# Patient Record
Sex: MALE | Race: WHITE | NOT HISPANIC OR LATINO | URBAN - METROPOLITAN AREA
[De-identification: names, ages, dates, MRNs, and addresses within clinical notes are randomized per-mention and may not be internally consistent; named-entity substitution may affect disease eponyms.]

---
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
## 2019-01-25 ENCOUNTER — OFFICE VISIT - RIVER FALLS (OUTPATIENT)
Dept: FAMILY MEDICINE | Facility: CLINIC | Age: 19
End: 2019-01-25

## 2019-01-25 ENCOUNTER — COMMUNICATION - RIVER FALLS (OUTPATIENT)
Dept: FAMILY MEDICINE | Facility: CLINIC | Age: 19
End: 2019-01-25

## 2019-01-25 LAB
ALBUMIN UR-MCNC: NEGATIVE G/DL
BACTERIA #/AREA URNS HPF: NORMAL /[HPF]
BILIRUB UR QL STRIP: NEGATIVE
EPITHELIAL CELLS UR: NORMAL
GLUCOSE UR STRIP-MCNC: NEGATIVE MG/DL
HGB UR QL STRIP: ABNORMAL
KETONES UR STRIP-MCNC: NEGATIVE MG/DL
LEUKOCYTE ESTERASE UR QL STRIP: NEGATIVE
NITRATE UR QL: NEGATIVE
PH UR STRIP: 7 [PH] (ref 5–8)
RBC #/AREA URNS AUTO: NORMAL /[HPF]
SP GR UR STRIP: 1.02 (ref 1–1.03)
WBC #/AREA URNS AUTO: NORMAL /[HPF]

## 2019-01-25 ASSESSMENT — MIFFLIN-ST. JEOR: SCORE: 1793.25

## 2019-01-26 LAB
CHLAMYDIA TRACHOMATIS RNA, TMA - QUEST: NOT DETECTED
HIV AG/AB  - NOTE: NORMAL
NEISSERIA GONORRHOEAE RNA TMA: DETECTED
RPR: NORMAL

## 2019-01-27 LAB — BACTERIA SPEC CULT: NORMAL

## 2019-03-13 ENCOUNTER — OFFICE VISIT - RIVER FALLS (OUTPATIENT)
Dept: FAMILY MEDICINE | Facility: CLINIC | Age: 19
End: 2019-03-13

## 2019-03-13 ASSESSMENT — MIFFLIN-ST. JEOR: SCORE: 1798.69

## 2019-04-17 ENCOUNTER — OFFICE VISIT - RIVER FALLS (OUTPATIENT)
Dept: FAMILY MEDICINE | Facility: CLINIC | Age: 19
End: 2019-04-17

## 2019-04-17 ASSESSMENT — MIFFLIN-ST. JEOR: SCORE: 1761.5

## 2019-04-18 LAB
CHLAMYDIA TRACHOMATIS RNA, TMA - QUEST: NOT DETECTED
NEISSERIA GONORRHOEAE RNA TMA: NOT DETECTED

## 2019-07-05 ENCOUNTER — OFFICE VISIT - RIVER FALLS (OUTPATIENT)
Dept: FAMILY MEDICINE | Facility: CLINIC | Age: 19
End: 2019-07-05

## 2019-07-05 ASSESSMENT — MIFFLIN-ST. JEOR: SCORE: 1788.71

## 2022-02-11 VITALS
HEART RATE: 64 BPM | BODY MASS INDEX: 28.72 KG/M2 | SYSTOLIC BLOOD PRESSURE: 118 MMHG | DIASTOLIC BLOOD PRESSURE: 74 MMHG | RESPIRATION RATE: 16 BRPM | WEIGHT: 183 LBS | TEMPERATURE: 97.6 F | HEIGHT: 67 IN

## 2022-02-11 VITALS
DIASTOLIC BLOOD PRESSURE: 76 MMHG | SYSTOLIC BLOOD PRESSURE: 118 MMHG | BODY MASS INDEX: 27.6 KG/M2 | TEMPERATURE: 97.7 F | HEART RATE: 60 BPM | WEIGHT: 176.2 LBS

## 2022-02-11 VITALS
DIASTOLIC BLOOD PRESSURE: 62 MMHG | TEMPERATURE: 97.1 F | DIASTOLIC BLOOD PRESSURE: 80 MMHG | SYSTOLIC BLOOD PRESSURE: 120 MMHG | TEMPERATURE: 97.8 F | HEART RATE: 64 BPM | HEIGHT: 67 IN | WEIGHT: 185.2 LBS | RESPIRATION RATE: 16 BRPM | BODY MASS INDEX: 27.78 KG/M2 | BODY MASS INDEX: 29.07 KG/M2 | HEART RATE: 64 BPM | WEIGHT: 177 LBS | SYSTOLIC BLOOD PRESSURE: 116 MMHG | HEIGHT: 67 IN

## 2022-02-11 VITALS
RESPIRATION RATE: 16 BRPM | DIASTOLIC BLOOD PRESSURE: 82 MMHG | TEMPERATURE: 98.2 F | WEIGHT: 184 LBS | BODY MASS INDEX: 28.88 KG/M2 | HEART RATE: 72 BPM | HEIGHT: 67 IN | SYSTOLIC BLOOD PRESSURE: 130 MMHG

## 2022-02-15 NOTE — NURSING NOTE
Comprehensive Intake Entered On:  1/25/2019 3:42 PM CST    Performed On:  1/25/2019 3:34 PM CST by Eleuterio Latif CMA               Summary   Chief Complaint :   Pt here for painful urination and penile discharge x day.   Menstrual Status :   N/A   Weight Measured :   184 lb(Converted to: 184 lb 0 oz, 83.46 kg)    Height Measured :   67 in(Converted to: 5 ft 7 in, 170.18 cm)    Body Mass Index :   28.82 kg/m2   Body Surface Area :   1.98 m2   Systolic Blood Pressure :   130 mmHg   Diastolic Blood Pressure :   82 mmHg (HI)    Mean Arterial Pressure :   98 mmHg   Peripheral Pulse Rate :   72 bpm   BP Site :   Right arm   Pulse Site :   Radial artery   Temperature Tympanic :   98.2 DegF(Converted to: 36.8 DegC)    Respiratory Rate :   16 br/min   Eleuterio Latif CMA - 1/25/2019 3:34 PM CST   Health Status   Immunizations Current :   No   Eleuterio Latif CMA - 1/25/2019 3:34 PM CST   Meds / Allergies   (As Of: 1/25/2019 3:42:31 PM CST)   Allergies (Active)   No known allergies  Estimated Onset Date:   Unspecified ; Created By:   Jessica Thomas; Reaction Status:   Active ; Category:   Drug ; Substance:   No known allergies ; Type:   Allergy ; Updated By:   Jessica Thomas; Reviewed Date:   5/18/2017 2:17 PM CDT        Medication List   (As Of: 1/25/2019 3:42:31 PM CST)   Prescription/Discharge Order    betamethasone topical  :   betamethasone topical ; Status:   Prescribed ; Ordered As Mnemonic:   betamethasone dipropionate 0.05% topical cream ; Simple Display Line:   1 sabi, TOP, BID, 50 gm, 2 Refill(s) ; Ordering Provider:   William Campos MD; Catalog Code:   betamethasone topical ; Order Dt/Tm:   5/22/2017 5:44:40 PM          triamcinolone topical  :   triamcinolone topical ; Status:   Prescribed ; Ordered As Mnemonic:   triamcinolone 0.1% topical cream ; Simple Display Line:   1 sabi, TOP, BID, 15 g, 1 Refill(s) ; Ordering Provider:   William Campos MD; Catalog Code:   triamcinolone topical ; Order Dt/Tm:    2017 2:22:11 PM            Procedures / Surgeries        -    Procedure History   (As Of: 2019 3:42:31 PM CST)     Family History   Family History   (As Of: 2019 3:42:31 PM CST)   Grandmother (P): unknown  Full Name:   unknown ; Relation:   Grandmother (P) ;             Nomenclature:   Kidney cancer ; Value:   Positive            Social History   Social History   (As Of: 2019 3:42:31 PM CST)   Tobacco:        Electronic Cigarettes   (Last Updated: 2019 3:39:03 PM CST by Eleuterio Latif CMA)          Employment and Education:        Student   Comments:  2019 3:39 PM - Eleuterio Latif CMA: Freshman at [employer]   (Last Updated: 2019 3:39:43 PM CST by Eleuterio Latif CMA)

## 2022-02-15 NOTE — PROGRESS NOTES
Patient:   MARISSA SINCLAIR            MRN: [id number]            FIN: [id number]               Age:   17 years     Sex:  Male     :  2000   Associated Diagnoses:   Atopic eczema   Author:   William Campos MD      Visit Information      Date of Service: 2017 01:39 pm  Performing Location: [facility]  Encounter#: [id number]      Chief Complaint   2017 1:50 PM CDT    Pt c/o excema sx.        History of Present Illness   Here with eczema on the arms and legs  treated in the past with topical steroids  mild involvement of neck and face      Review of Systems   All other systems were reviewed and are negative.      Health Status   Allergies:    Allergic Reactions (Selected)  No known allergies      Histories   Past Medical History:    No active or resolved past medical history items have been selected or recorded.   Family History:    No family history items have been selected or recorded.   Procedure history:    No active procedure history items have been selected or recorded.      Physical Examination   Vital Signs   2017 1:50 PM CDT Temperature Tympanic 97.7 DegF  LOW    Peripheral Pulse Rate 60 bpm    Systolic Blood Pressure 118 mmHg    Diastolic Blood Pressure 76 mmHg    Mean Arterial Pressure 90 mmHg      Measurements from flowsheet : Measurements   2017 1:50 PM CDT    Weight Measured - Standard                176.2 lb     General:  Alert and oriented, No acute distress.    Integumentary:  eczematous rash in antecubital and popliteal fossae, posterior neck and upper lip.       Impression and Plan   Diagnosis     Atopic eczema ([id number]-[place] L20.9).     Course:  Improving.    Plan:  topical steroids and follow up as needed.    Orders     Orders   Pharmacy:  triamcinolone 0.1% topical cream (Prescribe): 1 sabi, TOP, BID, # 15 g, 1 Refill(s), Type: Maintenance, Pharmacy: [id number], 1 sabi top bid  clobetasol 0.05% topical cream (Prescribe): 1 sabi, TOP, BID, # 45 g, 1  Refill(s), Type: Maintenance, Pharmacy: [id number], 1 sabi top bid.

## 2022-02-15 NOTE — LETTER
(Inserted Image. Unable to display)     March 29, 2019      MARISSA SINCLAIR   [address] [place], [place] [id number]          Dear MARISSA,      Thank you for selecting [facility] ([facility]) for your healthcare needs.      Our records indicate you are due for the following services:       Urine Labs ~ Please be prepared to leave a urine specimen for evaluation.      To schedule an appointment or if you have further questions, please contact your primary clinic:   [facility]       [phone]   [facility]       [phone]              [facility]     [phone]      Powered by [employer] and [employer]    Sincerely,    Rusty Tejada PA-C

## 2022-02-15 NOTE — TELEPHONE ENCOUNTER
---------------------  From: Silvia Medel CMA (Phone Messages Pool ([id number]_[facility]))   To: [facility] Message Pool ([id number]_[facility]);     Sent: 2/6/2019 11:10:06 AM CST  Subject: Phone Note: return call     Phone Message    PCP:   OLMAN, asked for [facility]      Time of Call:  11:07 am       Person Calling:  pt  Phone number:  [phone]    Returned call at: n/a    Note:   Pt calls requesting a call back from [facility] around 4pm today.    Last office visit and reason:  1/25/19; dysuria, urethral dischargeI called pt back and he just wanted to know when he needed to RTC for FU testing for positive gonorrhea from 1/25/19 visit with [facility].  I informed him per [facility] 1/28/19 that he needs to FU in 8 weeks, and that a RTC letter will be sent to him for a 3/29/19 lab visit for repeat UA.  Pt voiced understanding of these instructions.  Eleuterio Latif CMA

## 2022-02-15 NOTE — TELEPHONE ENCOUNTER
---------------------  From: Alana Srinivasan   To: William Campos MD; Terrell BUTTERFIELD, Rusty OSEGUERA;     Cc: Urban TAVERAS, Karol; Phone Messages Pool ([id number]_[place] [facility] [place]);      Sent: 1/27/2019 12:28:13 PM CST  Subject: Results Follow Up: Gonorrhe     Please review results.  Positive for Gonorrhoeae.  Patient seen on 1/25/19 for discharge with DWG.  Treated with Azithromycin 1000mg once.         Results:  Date Result Name Ind Value Ref Range   1/25/2019 3:57 PM HIV Ag/Ab  NON-REACTIVE (NONREACTIVE - NONREACTIVE)   1/25/2019 3:57 PM RPR Ql  NON-REACTIVE (NONREACTIVE - NONREACTIVE)   1/25/2019 4:20 PM Chlam/N. gonorrhea Comments  See comment    1/25/2019 4:20 PM Chlamydia RNA  NOT DETECTED (NOT DETECTED - )   1/25/2019 4:20 PM Neisseria gonorrhoeae RNA ((A)) DETECTED (NOT DETECTED - )   1/25/2019 4:21 PM Culture Urine  See comment---------------------  From: William Campos MD   To: Alana Srinivasan;     Sent: 1/27/2019 1:10:12 PM CST  Subject: RE: Results Follow Up: Gonorrhe     He was treated with ceftriaxone which is adequate coverage for GC

## 2022-02-15 NOTE — NURSING NOTE
Comprehensive Intake Entered On:  3/13/2019 12:55 PM CDT    Performed On:  3/13/2019 12:52 PM CDT by Elham Rodriguez CMA               Summary   Chief Complaint :   c/o redness rash on forehead and upper lip   Menstrual Status :   N/A   Weight Measured :   185.2 lb(Converted to: 185 lb 3 oz, 84.01 kg)    Height Measured :   67 in(Converted to: 5 ft 7 in, 170.18 cm)    Body Mass Index :   29 kg/m2 (HI)    Body Surface Area :   1.99 m2   Systolic Blood Pressure :   116 mmHg   Diastolic Blood Pressure :   62 mmHg   Mean Arterial Pressure :   80 mmHg   Peripheral Pulse Rate :   64 bpm   Temperature Tympanic :   97.8 DegF(Converted to: 36.6 DegC)  (LOW)    Elham Rodriguez CMA - 3/13/2019 12:52 PM CDT   Health Status   Allergies Verified? :   Yes   Medication History Verified? :   Yes   Immunizations Current :   No   Pre-Visit Planning Status :   Completed   Tobacco Use? :   Never smoker   Elham Rodriguez CMA - 3/13/2019 12:52 PM CDT   Consents   Consent for Immunization Exchange :   Consent Granted   Consent for Immunizations to Providers :   Consent Granted   Elham Rodriguez CMA - 3/13/2019 12:52 PM CDT   Meds / Allergies   (As Of: 3/13/2019 12:55:53 PM CDT)   Allergies (Active)   No known allergies  Estimated Onset Date:   Unspecified ; Created By:   Jessica Thomas; Reaction Status:   Active ; Category:   Drug ; Substance:   No known allergies ; Type:   Allergy ; Updated By:   Jessica Thomas; Reviewed Date:   1/25/2019 3:44 PM CST        Medication List   (As Of: 3/13/2019 12:55:53 PM CDT)   Prescription/Discharge Order    azithromycin  :   azithromycin ; Status:   Processing ; Ordered As Mnemonic:   azithromycin 500 mg oral tablet ; Ordering Provider:   Rusty Tejada PA-C; Action Display:   Complete ; Catalog Code:   azithromycin ; Order Dt/Tm:   3/13/2019 12:54:16 PM          betamethasone topical  :   betamethasone topical ; Status:   Prescribed ; Ordered As Mnemonic:   betamethasone  dipropionate 0.05% topical cream ; Simple Display Line:   1 sabi, TOP, BID, 50 gm, 2 Refill(s) ; Ordering Provider:   William Campos MD; Catalog Code:   betamethasone topical ; Order Dt/Tm:   5/22/2017 5:44:40 PM          triamcinolone topical  :   triamcinolone topical ; Status:   Prescribed ; Ordered As Mnemonic:   triamcinolone 0.1% topical cream ; Simple Display Line:   1 sabi, TOP, BID, 15 g, 1 Refill(s) ; Ordering Provider:   William Campos MD; Catalog Code:   triamcinolone topical ; Order Dt/Tm:   5/18/2017 2:22:11 PM

## 2022-02-15 NOTE — NURSING NOTE
Comprehensive Intake Entered On:  7/5/2019 4:49 PM CDT    Performed On:  7/5/2019 4:44 PM CDT by Eleuterio Latif CMA               Summary   Chief Complaint :   Pt here for dry cracked skin on top lip x 2 days.   Menstrual Status :   N/A   Weight Measured :   183 lb(Converted to: 183 lb 0 oz, 83.01 kg)    Height Measured :   67 in(Converted to: 5 ft 7 in, 170.18 cm)    Body Mass Index :   28.66 kg/m2 (HI)    Body Surface Area :   1.98 m2   Systolic Blood Pressure :   118 mmHg   Diastolic Blood Pressure :   74 mmHg   Mean Arterial Pressure :   89 mmHg   Peripheral Pulse Rate :   64 bpm   BP Site :   Right arm   Pulse Site :   Radial artery   Temperature Tympanic :   97.6 DegF(Converted to: 36.4 DegC)  (LOW)    Respiratory Rate :   16 br/min   Eleuterio Latif CMA - 7/5/2019 4:44 PM CDT   Health Status   Allergies Verified? :   Yes   Medication History Verified? :   Yes   Immunizations Current :   No   Medical History Verified? :   Yes   Pre-Visit Planning Status :   Not completed   Tobacco Use? :   Current every day smoker   Eleuterio Latfi CMA - 7/5/2019 4:44 PM CDT   Meds / Allergies   (As Of: 7/5/2019 4:49:20 PM CDT)   Allergies (Active)   No known allergies  Estimated Onset Date:   Unspecified ; Created By:   Jessica Thomas; Reaction Status:   Active ; Category:   Drug ; Substance:   No known allergies ; Type:   Allergy ; Updated By:   Jessica Thomas; Reviewed Date:   7/5/2019 4:49 PM CDT        Medication List   (As Of: 7/5/2019 4:49:20 PM CDT)   Prescription/Discharge Order    triamcinolone topical  :   triamcinolone topical ; Status:   Prescribed ; Ordered As Mnemonic:   triamcinolone 0.1% topical cream ; Simple Display Line:   1 sabi, Topical, tid, 60 gm, 3 Refill(s) ; Ordering Provider:   Adalid De La Crzu MD; Catalog Code:   triamcinolone topical ; Order Dt/Tm:   3/13/2019 1:02:21 PM            Social History   Social History   (As Of: 7/5/2019 4:49:20 PM CDT)   Tobacco:        Electronic Cigarettes   (Last  Updated: 1/25/2019 3:39:03 PM CST by Eleuterio Latif CMA)          Employment/School:        Student   Comments:  1/25/2019 3:39 PM - Eleuterio Latif CMA: Freshman at [employer]   (Last Updated: 1/25/2019 3:39:43 PM CST by Eleuterio Latif CMA)

## 2022-02-15 NOTE — LETTER
(Inserted Image. Unable to display)   January 28, 2019      MARISSA SINCLAIR       [address] [place], [place] [id number]        Dear MARISSA,    Thank you for selecting [facility] for your healthcare needs.  Below you will find the results of you recent tests done at our clinic.    Your test came back positive for gonorrhea.  You have received adequate treatment for that.  Please follow up in clinic if your symptoms are not improving.  The rest of your tests were negative.  Please come in to recheck for gonorrhea in 8 weeks.      Result Name Current Result Reference Range   HIV Ag/Ab  NON-REACTIVE 1/25/2019 NONREACTIVE - NONREACTIVE   RPR Ql  NON-REACTIVE 1/25/2019 NONREACTIVE - NONREACTIVE   Chlamydia RNA  NOT DETECTED 1/25/2019 NOT DETECTED -    Neisseria gonorrhoeae RNA ((A)) DETECTED 1/25/2019 NOT DETECTED -        Please contact me or my assistant at [phone] if you have any questions.     Sincerely,        Rusty Tejada PA-C    What do your labs mean?  Below is a glossary of commonly ordered labs:  LDL   Bad Cholesterol   HDL   Good Cholesterol  AST/ALT   Liver Function   Cr/Creatinine   Kidney Function  Microalbumin   Kidney Function  BUN   Kidney Function  PSA   Prostate    TSH   Thyroid Hormone  HgbA1c   Diabetes Test   Hgb (Hemoglobin)   Red Blood Cells

## 2022-02-15 NOTE — PROGRESS NOTES
Patient:   MARISSA SINCLAIR            MRN: [id number]            FIN: [id number]               Age:   19 years     Sex:  Male     :  2000   Associated Diagnoses:   Screening for STDs (sexually transmitted diseases)   Author:   Rusty Tejada PA-C      Chief Complaint   2019 10:57 AM CDT   Pt here for FU STD labwork from positive gonorrhea test on 19.      History of Present Illness   Chief complaint and symptoms noted above and confirmed with patient   he had positive gonorrhea test on 2019, was treated with ceftriaxone and zithromax  sxs resolved, no further sxs  here today for retesting      Review of Systems   Genitourinary:  Negative.       Health Status   Allergies:    Allergic Reactions (All)  No known allergies   Medications:  (Selected)   Prescriptions  Prescribed  triamcinolone 0.1% topical cream: 1 sabi, Topical, tid, # 60 gm, 3 Refill(s), Type: Maintenance, Pharmacy: [facility], 1 sabi Topical tid      Histories   Past Medical History:    No active or resolved past medical history items have been selected or recorded.   Family History:    Kidney cancer  Grandmother (P) (unknown, )     Procedure history:    No active procedure history items have been selected or recorded.   Social History:        Tobacco Assessment            Electronic Cigarettes      Employment and Education Assessment            Student                     Comments:                      2019 - Eleuterio Latif CMA                     Freshman at [employer]      Physical Examination   Vital Signs   2019 10:57 AM CDT Temperature Tympanic 97.1 DegF  LOW    Peripheral Pulse Rate 64 bpm    Pulse Site Radial artery    Respiratory Rate 16 br/min    Systolic Blood Pressure 120 mmHg    Diastolic Blood Pressure 80 mmHg    Mean Arterial Pressure 93 mmHg    BP Site Right arm      Measurements from flowsheet : Measurements   2019 10:57 AM CDT Height Measured - Standard 67 in    Weight Measured -  Standard 177 lb    BSA 1.95 m2    Body Mass Index 27.72 kg/m2  HI      General:  No acute distress.    Eye:  Pupils are equal, round and reactive to light.    HENT:  Tympanic membranes are clear.    Respiratory:  Lungs are clear to auscultation.    Cardiovascular:  Normal rate, Regular rhythm, No murmur.    Psychiatric:  Appropriate mood & affect.       Impression and Plan   Diagnosis     Screening for STDs (sexually transmitted diseases) ([id number] Z11.3).     Orders     Orders   Lab (Gen Lab  Reference Lab):  Chlamydia/Neisseria gonorrhoeae RNA, TMA* ([facility]) (Order): Specimen Type: Urine, Collection Date: 4/17/2019 11:10 AM CDT.     Orders   Charges (Evaluation and Management):  34912 office outpatient visit 15 minutes (Charge) (Order): Quantity: 1, Screening for STDs (sexually transmitted diseases).     Summary:  patient asks to be called with results.

## 2022-02-15 NOTE — NURSING NOTE
Comprehensive Intake Entered On:  4/17/2019 11:02 AM CDT    Performed On:  4/17/2019 10:57 AM CDT by Eleuterio Latif CMA               Summary   Chief Complaint :   Pt here for FU STD labwork from positive gonorrhea test on 1-25-19.     Menstrual Status :   N/A   Weight Measured :   177 lb(Converted to: 177 lb 0 oz, 80.29 kg)    Height Measured :   67 in(Converted to: 5 ft 7 in, 170.18 cm)    Body Mass Index :   27.72 kg/m2 (HI)    Body Surface Area :   1.95 m2   Systolic Blood Pressure :   120 mmHg   Diastolic Blood Pressure :   80 mmHg   Mean Arterial Pressure :   93 mmHg   Peripheral Pulse Rate :   64 bpm   BP Site :   Right arm   Pulse Site :   Radial artery   Temperature Tympanic :   97.1 DegF(Converted to: 36.2 DegC)  (LOW)    Respiratory Rate :   16 br/min   Eleuterio Latif CMA - 4/17/2019 10:57 AM CDT   Health Status   Allergies Verified? :   Yes   Medication History Verified? :   Yes   Immunizations Current :   No   Medical History Verified? :   Yes   Pre-Visit Planning Status :   Not completed   Tobacco Use? :   Current every day smoker   Eleuterio Latif CMA - 4/17/2019 10:57 AM CDT   Meds / Allergies   (As Of: 4/17/2019 11:02:51 AM CDT)   Allergies (Active)   No known allergies  Estimated Onset Date:   Unspecified ; Created By:   Jessica Thomas; Reaction Status:   Active ; Category:   Drug ; Substance:   No known allergies ; Type:   Allergy ; Updated By:   Jessica Thomas; Reviewed Date:   4/17/2019 11:01 AM CDT        Medication List   (As Of: 4/17/2019 11:02:51 AM CDT)   Prescription/Discharge Order    predniSONE  :   predniSONE ; Status:   Processing ; Ordered As Mnemonic:   predniSONE 10 mg oral tablet ; Ordering Provider:   Adalid De La Cruz MD; Action Display:   Complete ; Catalog Code:   predniSONE ; Order Dt/Tm:   4/17/2019 10:58:41 AM          sulfamethoxazole-trimethoprim  :   sulfamethoxazole-trimethoprim ; Status:   Processing ; Ordered As Mnemonic:   sulfamethoxazole-trimethoprim 800 mg-160  mg oral tablet ; Ordering Provider:   Adalid De La Cruz MD; Action Display:   Complete ; Catalog Code:   sulfamethoxazole-trimethoprim ; Order Dt/Tm:   4/17/2019 10:58:44 AM          betamethasone topical  :   betamethasone topical ; Status:   Processing ; Ordered As Mnemonic:   betamethasone dipropionate 0.05% topical cream ; Ordering Provider:   William Campos MD; Action Display:   Complete ; Catalog Code:   betamethasone topical ; Order Dt/Tm:   4/17/2019 11:01:02 AM          triamcinolone topical  :   triamcinolone topical ; Status:   Processing ; Ordered As Mnemonic:   triamcinolone 0.1% topical cream ; Ordering Provider:   William Campos MD; Action Display:   Complete ; Catalog Code:   triamcinolone topical ; Order Dt/Tm:   4/17/2019 10:58:52 AM          triamcinolone topical  :   triamcinolone topical ; Status:   Prescribed ; Ordered As Mnemonic:   triamcinolone 0.1% topical cream ; Simple Display Line:   1 sabi, Topical, tid, 60 gm, 3 Refill(s) ; Ordering Provider:   Adalid De La Cruz MD; Catalog Code:   triamcinolone topical ; Order Dt/Tm:   3/13/2019 1:02:21 PM            Social History   Social History   (As Of: 4/17/2019 11:02:51 AM CDT)   Tobacco:        Electronic Cigarettes   (Last Updated: 1/25/2019 3:39:03 PM CST by Eleuterio Latif CMA)          Employment and Education:        Student   Comments:  1/25/2019 3:39 PM - Eleuterio Latif CMA: Freshman at [employer]   (Last Updated: 1/25/2019 3:39:43 PM CST by Eleuterio Latif CMA)

## 2022-02-15 NOTE — LETTER
(Inserted Image. Unable to display)   April 19, 2019      MARISSA SINCLAIR       [address] [place], [place] [id number]        Dear MARISSA,    Thank you for selecting [facility] for your healthcare needs.  Below you will find the results of you recent tests done at our clinic.     Tests for chlamydia and gonorrhea were negative.      Result Name Current Result Previous Result Reference Range   Chlamydia RNA  NOT DETECTED 4/17/2019  NOT DETECTED 1/25/2019 NOT DETECTED -    Neisseria gonorrhoeae RNA  NOT DETECTED 4/17/2019 ((A)) DETECTED 1/25/2019 NOT DETECTED -        Please contact me or my assistant at [phone] if you have any questions.     Sincerely,        Rusty Tejada PA-C    What do your labs mean?  Below is a glossary of commonly ordered labs:  LDL   Bad Cholesterol   HDL   Good Cholesterol  AST/ALT   Liver Function   Cr/Creatinine   Kidney Function  Microalbumin   Kidney Function  BUN   Kidney Function  PSA   Prostate    TSH   Thyroid Hormone  HgbA1c   Diabetes Test   Hgb (Hemoglobin)   Red Blood Cells

## 2022-02-15 NOTE — NURSING NOTE
Phone Message    PCP: ENRRIQUE    Time of call: 10:12am message left    Person calling: Antonio  Contact # : [phone], ok IBAN    MESSAGE: Pt LM stating he was returning a call, likely about recent lab results.    Last visit/reason: 4/17/19 - STD screening    10:34am Called and spoke with pt. Informed him that a result letter was created today indicating that he is negative for Chlamydia and Gonorrhea.

## 2022-02-15 NOTE — PROGRESS NOTES
Patient:   MARISSA SINCLAIR            MRN: [id number]            FIN: [id number]               Age:   18 years     Sex:  Male     :  2000   Associated Diagnoses:   Dysuria; Urethral discharge   Author:   Rusty Tejada PA-C      Chief Complaint   2019 3:34 PM CST    Pt here for painful urination and penile discharge x day.      History of Present Illness   Chief complaint and symptoms noted above and confirmed with patient   had intercourse last week, the condom broke  not aware if his partner has been having any sxs  then yesterday has had painful urination and cloudy urethral discharge         Review of Systems   Genitourinary:  Dysuria, Urethral discharge.       Health Status   Allergies:    Allergic Reactions (Selected)  No known allergies   Medications:  (Selected)   Prescriptions  Prescribed  betamethasone dipropionate 0.05% topical cream: 1 sabi, TOP, BID, # 50 gm, 2 Refill(s), Type: Maintenance, Pharmacy: [facility] [id number], 1 sabi top bid  triamcinolone 0.1% topical cream: 1 sabi, TOP, BID, # 15 g, 1 Refill(s), Type: Maintenance, Pharmacy: [id number], 1 sabi top bid      Histories   Past Medical History:    No active or resolved past medical history items have been selected or recorded.   Family History:    Kidney cancer  Grandmother (P) (unknown, )     Procedure history:    No active procedure history items have been selected or recorded.   Social History:        Tobacco Assessment            Electronic Cigarettes      Employment and Education Assessment            Student                     Comments:                      2019 - Eleuterio Latif CMA                     Freshman at [employer]      Physical Examination   Vital Signs   2019 3:34 PM CST Temperature Tympanic 98.2 DegF    Peripheral Pulse Rate 72 bpm    Pulse Site Radial artery    Respiratory Rate 16 br/min    Systolic Blood Pressure 130 mmHg    Diastolic Blood Pressure 82 mmHg  HI    Mean Arterial Pressure  98 mmHg    BP Site Right arm      Measurements from flowsheet : Measurements   1/25/2019 3:34 PM CST Height Measured - Standard 67 in    Weight Measured - Standard 184 lb    BSA 1.98 m2    Body Mass Index 28.82 kg/m2    Body Mass Index Percentile 91.52      General:  No acute distress.    Genitourinary:  testicles smooth symmetrical, without nodules, no rashes or lesions, no hernia present, there is inflammation at urethral meatus, with some purulent drainage, swab is taken.       Review / Management   Results review:  Lab results   1/25/2019 4:05 PM CST UA pH 7.0    UA Specific Gravity 1.020    UA Glucose NEGATIVE    UA Bilirubin NEGATIVE    UA Ketones NEGATIVE    Urine Occult Blood TRACE    UA Protein NEGATIVE    UA Nitrite NEGATIVE    UA Leukocyte Esterase NEGATIVE   .         Interpretation: will also get a urine culture, patient will be informed if culture results in a change of therapy.       Impression and Plan   Diagnosis     Dysuria (ABN05-GM R30.0).     Urethral discharge (SIZ32-LY R36.9).     Summary:  will treat presumptively as gonorrhea with 250 mg rocephin and 1 gm azithromycin po once  no intercourse for a week  will await lab results.    Orders     Orders   Lab (Gen Lab  Reference Lab):  POC URINALYSIS, UA* ([facility]) (Order): Specimen Type: Urine, Collection Date: 1/25/2019 3:50 PM CST.     Orders   Lab (Gen Lab  Reference Lab):  Culture, Urine, Routine* ([facility]) (Order): Specimen Type: Urine (Clean Catch), Collection Date: 1/25/2019 3:50 PM CST  RPR (dx) w/refl titer and confirmatory testing* ([facility]) (Order): Specimen Type: Serum, Collection Date: 1/25/2019 3:50 PM CST  HIV-1/2 Antigen and Antibodies, Fourth Generation, with Reflexes* ([facility]) (Order): Specimen Type: Serum, Collection Date: 1/25/2019 3:50 PM CST  Chlamydia/Neisseria gonorrhoeae RNA, TMA* ([facility]) (Order): Specimen Type: Swab, Collection Date: 1/25/2019 3:50 PM CST.     Orders   Pharmacy:  azithromycin 500 mg oral tablet (Prescribe): =  2 tab(s) ( 1,000 mg ), PO, Once, # 2 tab(s), 0 Refill(s), Type: Soft Stop, Pharmacy: [facility] [id number], 2 tab(s) Oral once.     Orders   Charges (Evaluation and Management):  69795 office outpatient visit 15 minutes (Charge) (Order): Quantity: 1, Urethral discharge  Dysuria.

## 2022-02-15 NOTE — PROGRESS NOTES
Patient:   MARISSA SINCLAIR            MRN: [id number]            FIN: [id number]               Age:   19 years     Sex:  Male     :  2000   Associated Diagnoses:   Eczema   Author:   Adalid De La Cruz MD      Chief Complaint   3/13/2019 12:52 PM CDT   c/o redness rash on forehead and upper lip      History of Present Illness   see chief complaint as noted above and confirmed with the patient   19 year old male presenting with concerns of infected eczema. He states he has had eczema for years but it's been well controlled over the last 3-4 years with regular lotion. He has used steriod creams in the past when it's gotten bad. He states he has had a flare and has been using lotion. It's started on his face above his lip and his forehead. It has started spreading into his arms. He states his arms aren't bothering him yet but the areas on his face have gotten worse, are painful and he has noticed  a little bit of  drainage coming from it.      Review of Systems   Constitutional:  No fever, No chills, No fatigue.    Ear/Nose/Mouth/Throat:  No sore throat.    Respiratory:  No shortness of breath, No cough.    Cardiovascular:  No chest pain.    Gastrointestinal:  No nausea, No vomiting.    Genitourinary:  Negative.    Musculoskeletal:  No back pain.    Integumentary:  Rash, Dryness.    Neurologic:  No headache.    Psychiatric:  Negative.              Health Status   Allergies:    Allergic Reactions (Selected)  No known allergies   Medications:  (Selected)   Prescriptions  Prescribed  betamethasone dipropionate 0.05% topical cream: 1 sabi, TOP, BID, # 50 gm, 2 Refill(s), Type: Maintenance, Pharmacy: [facility] [id number], 1 sabi top bid  triamcinolone 0.1% topical cream: 1 sabi, TOP, BID, # 15 g, 1 Refill(s), Type: Maintenance, Pharmacy: [facility] [id number], 1 sabi top bid   Problem list:    No problem items selected or recorded.      Histories   Past Medical History:    No active or resolved past medical history  items have been selected or recorded.   Family History:    Kidney cancer  Grandmother (P) (unknown, )     Procedure history:    No active procedure history items have been selected or recorded.   Social History:        Tobacco Assessment            Electronic Cigarettes      Employment and Education Assessment            Student                     Comments:                      2019 - Bhavya MARQUEZ, Eleuterio                     Freshman at [employer]      Physical Examination   Vital Signs   3/13/2019 12:52 PM CDT Temperature Tympanic 97.8 DegF  LOW    Peripheral Pulse Rate 64 bpm    Systolic Blood Pressure 116 mmHg    Diastolic Blood Pressure 62 mmHg    Mean Arterial Pressure 80 mmHg      Measurements from flowsheet : Measurements   3/13/2019 12:52 PM CDT Height Measured - Standard 67 in    Weight Measured - Standard 185.2 lb    BSA 1.99 m2    Body Mass Index 29 kg/m2  HI      General:  Alert and oriented, No acute distress.    Eye:  Pupils are equal, round and reactive to light, Normal conjunctiva.    HENT:  Oral mucosa is moist.    Neck:  Supple.    Respiratory:  Respirations are non-labored.    Cardiovascular:  Normal rate, Regular rhythm, No edema.    Gastrointestinal:  Non-distended.    Musculoskeletal:  Normal gait.    Integumentary:  Warm, No rash.    Psychiatric:  Cooperative, Appropriate mood & affect, Normal judgment.       Review / Management   Results review      Impression and Plan   Diagnosis     Eczema (NSK73-TZ L30.9).     Plan:  Will treat infection with Septra DS bid for 7 days. Will start him on Prednisone 20mg for 7 days then have him decrease to 10mg daily for 7 days. Will also have him start putting triamcinolone cream on areas. Discussed that he can choose to spot using this when symptoms resolve and restart whenever he has flares or he could continue to use daily in areas where he tends to flare the most at.  Suzette DALTON CMA, acted solely as a scribe for, and in the presence of  Dr. Adalid De La Cruz who performed the service..

## 2022-02-15 NOTE — PROGRESS NOTES
Patient:   MARISSA SINCLAIR            MRN: [id number]            FIN: [id number]               Age:   19 years     Sex:  Male     :  2000   Associated Diagnoses:   Eczema   Author:   Rusty Tejada PA-C      Chief Complaint   2019 4:44 PM CDT     Pt here for dry cracked skin on top lip x 2 days.      History of Present Illness   3/13/2019:  19 year old male presenting with concerns of infected eczema. He states he has had eczema for years but it's been well controlled over the last 3-4 years with regular lotion. He has used steriod creams in the past when it's gotten bad. He states he has had a flare and has been using lotion. It's started on his face above his lip and his forehead. It has started spreading into his arms. He states his arms aren't bothering him yet but the areas on his face have gotten worse, are painful and he has noticed  a little bit of  drainage coming from it.      2019: here today for dry cracked skin on skin just below his nose,  his lip is not directly affected  he has been using some triamcinolone cream on it,       Health Status   Allergies:    Allergic Reactions (All)  No known allergies   Medications:  (Selected)   Prescriptions  Prescribed  triamcinolone 0.1% topical cream: 1 sabi, Topical, tid, # 60 gm, 3 Refill(s), Type: Maintenance, Pharmacy: [facility] [id number], 1 sabi Topical tid   Problem list:    All Problems  Eczema / SNOMED CT [id number] / Confirmed      Histories   Past Medical History:    No active or resolved past medical history items have been selected or recorded.   Family History:    Kidney cancer  Grandmother (P) (unknown, )     Procedure history:    No active procedure history items have been selected or recorded.      Physical Examination   Vital Signs   2019 4:44 PM CDT Temperature Tympanic 97.6 DegF  LOW    Peripheral Pulse Rate 64 bpm    Pulse Site Radial artery    Respiratory Rate 16 br/min    Systolic Blood Pressure 118 mmHg    Diastolic  Blood Pressure 74 mmHg    Mean Arterial Pressure 89 mmHg    BP Site Right arm      Measurements from flowsheet : Measurements   7/5/2019 4:44 PM CDT Height Measured - Standard 67 in    Weight Measured - Standard 183 lb    BSA 1.98 m2    Body Mass Index 28.66 kg/m2  HI      General:  No acute distress.    Integumentary:  on skin above upper lip there are red scattered papules, some are open and scabbed, no drainage.       Impression and Plan   Diagnosis     Eczema ([id number]-RC L30.9).     Summary:  will treat with Bactrim DS bid for 7 days, also use triple antibiotic or bacitracin topically to control outbreak and then can use TCM cream for prevention.    Orders     Orders   Pharmacy:  Bactrim  mg-160 mg oral tablet (Prescribe): 1 tab(s), Oral, bid, x 7 day(s), # 14 tab(s), 0 Refill(s), Type: Acute, Pharmacy: [facility] [id number], 1 tab(s) Oral bid,x7 day(s).     Orders   Charges (Evaluation and Management):  [id number] office outpatient visit 15 minutes (Charge) (Order): Quantity: 1, Eczema.